# Patient Record
Sex: MALE | URBAN - METROPOLITAN AREA
[De-identification: names, ages, dates, MRNs, and addresses within clinical notes are randomized per-mention and may not be internally consistent; named-entity substitution may affect disease eponyms.]

---

## 2021-10-19 ENCOUNTER — APPOINTMENT (RX ONLY)
Dept: URBAN - METROPOLITAN AREA CLINIC 77 | Facility: CLINIC | Age: 26
Setting detail: DERMATOLOGY
End: 2021-10-19

## 2021-10-19 DIAGNOSIS — L21.8 OTHER SEBORRHEIC DERMATITIS: ICD-10-CM

## 2021-10-19 PROCEDURE — ? TREATMENT REGIMEN

## 2021-10-19 PROCEDURE — 99203 OFFICE O/P NEW LOW 30 MIN: CPT

## 2021-10-19 PROCEDURE — ? PRESCRIPTION

## 2021-10-19 PROCEDURE — ? COUNSELING

## 2021-10-19 RX ORDER — CLOBETASOL PROPIONATE 0.05 G/100ML
SHAMPOO TOPICAL
Qty: 118 | Refills: 3 | Status: ERX | COMMUNITY
Start: 2021-10-19

## 2021-10-19 RX ORDER — KETOCONAZOLE 20 MG/ML
SHAMPOO, SUSPENSION TOPICAL
Qty: 120 | Refills: 3 | Status: ERX | COMMUNITY
Start: 2021-10-19

## 2021-10-19 RX ORDER — HYDROCORTISONE ACETATE, IODOQUINOL 19; 10 MG/G; MG/G
CREAM TOPICAL
Qty: 30 | Refills: 3 | Status: ERX | COMMUNITY
Start: 2021-10-19

## 2021-10-19 RX ADMIN — HYDROCORTISONE ACETATE, IODOQUINOL: 19; 10 CREAM TOPICAL at 00:00

## 2021-10-19 RX ADMIN — KETOCONAZOLE: 20 SHAMPOO, SUSPENSION TOPICAL at 00:00

## 2021-10-19 RX ADMIN — CLOBETASOL PROPIONATE: 0.05 SHAMPOO TOPICAL at 00:00

## 2021-10-19 NOTE — PROCEDURE: TREATMENT REGIMEN
Detail Level: Zone
Plan: Location: Face and scalp \\nPrescribe: ketoconazole 2 % shampoo 2-3x/week (Apply to face and scalp, let it sit for a few mins, then rinse) \\nVytone 1.9 %-1 % topical cream packet (Apply to face nightly as needed for flare ups.)\\nclobetasol 0.05 % shampoo (Apply to wet scalp 1-2 times a week, leave on for 30 mins to 1 hour  and then wash off to the back. Avoid face.) \\n\\nDiscussed with patient that this irritation, dry scaly rash is an immune mediated condition that can be triggered or exacerbated by several factors such as lack of sleep, stress, allergies, or illness. \\nThere is also a genetic component which we discussed can be helped by taking good care of the skin with a barrier cream and avoiding harsh products.\\n\\nWill prescribe Ketoconazole tropical shampoo, Clobetsol topical shampoo and Vytone topical cream x 30 days to help relieve inflammation.\\nRecommend taking Allegra 180mg daily to decrease histamine level and eliminate this trigger.\\n\\n\\nPLAN: \\n\\n1. Patient is to lather Clobetasol topical shampoo into the scalp allow to sit for an hour and then rinse back \\n2.Patient is to apply Vytone topical cream nightly, rinse in the morning (face) \\n3.Patient is to lather Ketoconazole shampoo on the beard, eyebrows and hair line\\n\\n\\nPatient is to follow up in 6-8 weeks

## 2021-12-06 ENCOUNTER — APPOINTMENT (RX ONLY)
Dept: URBAN - METROPOLITAN AREA CLINIC 77 | Facility: CLINIC | Age: 26
Setting detail: DERMATOLOGY
End: 2021-12-06

## 2021-12-06 DIAGNOSIS — L21.8 OTHER SEBORRHEIC DERMATITIS: ICD-10-CM

## 2021-12-06 PROCEDURE — 99213 OFFICE O/P EST LOW 20 MIN: CPT

## 2021-12-06 PROCEDURE — ? TREATMENT REGIMEN

## 2021-12-06 PROCEDURE — ? COUNSELING

## 2021-12-06 PROCEDURE — ? PRESCRIPTION

## 2021-12-06 RX ORDER — DESONIDE 0.5 MG/G
CREAM TOPICAL BID
Qty: 60 | Refills: 6 | Status: ERX | COMMUNITY
Start: 2021-12-06

## 2021-12-06 RX ORDER — KETOCONAZOLE 20 MG/ML
SHAMPOO, SUSPENSION TOPICAL
Qty: 120 | Refills: 7 | Status: ERX

## 2021-12-06 RX ORDER — HYDROCORTISONE ACETATE, IODOQUINOL 19; 10 MG/G; MG/G
CREAM TOPICAL
Qty: 30 | Refills: 6 | Status: ERX

## 2021-12-06 RX ORDER — CLOBETASOL PROPIONATE 0.05 G/100ML
SHAMPOO TOPICAL
Qty: 118 | Refills: 6 | Status: ERX

## 2021-12-06 RX ADMIN — DESONIDE: 0.5 CREAM TOPICAL at 00:00

## 2021-12-06 NOTE — PROCEDURE: TREATMENT REGIMEN
Detail Level: Zone
Plan: Location: Face and scalp \\nPrescribe: ketoconazole 2 % shampoo 2-3x/week (Apply to face and scalp, let it sit for a few mins, then rinse) \\nVytone 1.9 %-1 % topical cream packet (Apply to face nightly as needed for flare ups.)\\nclobetasol 0.05 % shampoo (Apply to wet scalp 1-2 times a week, leave on for 30 mins to 1 hour  and then wash off to the back. Avoid face.) Desonide topical cream\\n\\nPatient comes in today for dandruff on scalp.\\nPatient states he has seen an improvement on scalp with medication regimens he was prescribed.\\nDiscussed with patient that his scalp looks so much better and has improved a lot from his last office visit.\\nDiscussed with patient I will prescribe him Desonide topical cream that he will apply to scalp.\\nReiterated patient to start taking 3 tablets of Allegra or Zyrtec daily to lower anti histamine levels.\\nPatient is here for further treatment and evaluation.\\n\\nDiscussed with patient that this irritation, dry scaly rash is an immune mediated condition that can be triggered or exacerbated by several factors such as lack of sleep, stress, allergies, or illness. \\nThere is also a genetic component which we discussed can be helped by taking good care of the skin with a barrier cream and avoiding harsh products.\\n\\n\\nPLAN: \\n\\n1. Patient is to lather Clobetasol topical shampoo into the scalp allow to sit for an hour and then rinse back \\n2.Patient is to apply Vytone topical cream nightly, rinse in the morning (face) \\n3.Patient is to lather Ketoconazole shampoo on the beard, eyebrows and hair line\\n4. Desonide topical gel-----I am giving this gel because it should be easy to rub in to the beard\\n\\n\\nFollow up: PRN

## 2022-02-01 ENCOUNTER — APPOINTMENT (RX ONLY)
Dept: URBAN - METROPOLITAN AREA CLINIC 77 | Facility: CLINIC | Age: 27
Setting detail: DERMATOLOGY
End: 2022-02-01

## 2022-02-01 DIAGNOSIS — L21.8 OTHER SEBORRHEIC DERMATITIS: ICD-10-CM

## 2022-02-01 DIAGNOSIS — D22 MELANOCYTIC NEVI: ICD-10-CM

## 2022-02-01 DIAGNOSIS — L81.4 OTHER MELANIN HYPERPIGMENTATION: ICD-10-CM

## 2022-02-01 PROBLEM — D22.5 MELANOCYTIC NEVI OF TRUNK: Status: ACTIVE | Noted: 2022-02-01

## 2022-02-01 PROBLEM — D48.5 NEOPLASM OF UNCERTAIN BEHAVIOR OF SKIN: Status: ACTIVE | Noted: 2022-02-01

## 2022-02-01 PROBLEM — D22.4 MELANOCYTIC NEVI OF SCALP AND NECK: Status: ACTIVE | Noted: 2022-02-01

## 2022-02-01 PROBLEM — D22.61 MELANOCYTIC NEVI OF RIGHT UPPER LIMB, INCLUDING SHOULDER: Status: ACTIVE | Noted: 2022-02-01

## 2022-02-01 PROCEDURE — ? COUNSELING

## 2022-02-01 PROCEDURE — 99213 OFFICE O/P EST LOW 20 MIN: CPT | Mod: 25

## 2022-02-01 PROCEDURE — ? PRESCRIPTION

## 2022-02-01 PROCEDURE — ? BIOPSY BY SHAVE METHOD

## 2022-02-01 PROCEDURE — 11102 TANGNTL BX SKIN SINGLE LES: CPT

## 2022-02-01 PROCEDURE — ? TREATMENT REGIMEN

## 2022-02-01 RX ORDER — CLOBETASOL PROPIONATE 0.05 G/100ML
SHAMPOO TOPICAL
Qty: 118 | Refills: 8 | Status: ERX

## 2022-02-01 RX ORDER — CLOBETASOL PROPIONATE 0.5 MG/ML
SOLUTION TOPICAL
Qty: 50 | Refills: 12 | Status: ERX | COMMUNITY
Start: 2022-02-01

## 2022-02-01 RX ORDER — KETOCONAZOLE 20 MG/ML
SHAMPOO, SUSPENSION TOPICAL
Qty: 120 | Refills: 11 | Status: ERX

## 2022-02-01 RX ORDER — HYDROCORTISONE ACETATE, IODOQUINOL 19; 10 MG/G; MG/G
CREAM TOPICAL
Qty: 30 | Refills: 10 | Status: ERX

## 2022-02-01 RX ORDER — DESONIDE 0.5 MG/G
CREAM TOPICAL BID
Qty: 60 | Refills: 9 | Status: ERX

## 2022-02-01 RX ADMIN — CLOBETASOL PROPIONATE: 0.5 SOLUTION TOPICAL at 00:00

## 2022-02-01 ASSESSMENT — LOCATION DETAILED DESCRIPTION DERM
LOCATION DETAILED: SUPERIOR THORACIC SPINE
LOCATION DETAILED: MID TRAPEZIAL NECK
LOCATION DETAILED: LEFT ANTERIOR SHOULDER
LOCATION DETAILED: RIGHT POSTERIOR SHOULDER
LOCATION DETAILED: RIGHT ANTERIOR SHOULDER

## 2022-02-01 ASSESSMENT — LOCATION SIMPLE DESCRIPTION DERM
LOCATION SIMPLE: UPPER BACK
LOCATION SIMPLE: LEFT SHOULDER
LOCATION SIMPLE: TRAPEZIAL NECK
LOCATION SIMPLE: RIGHT SHOULDER

## 2022-02-01 ASSESSMENT — LOCATION ZONE DERM
LOCATION ZONE: NECK
LOCATION ZONE: ARM
LOCATION ZONE: TRUNK

## 2022-02-01 NOTE — PROCEDURE: TREATMENT REGIMEN
Detail Level: Zone
Plan: Location: Face and scalp \\nContinue using: ketoconazole 2 % shampoo 2-3x/week (Apply to face and scalp, let it sit for a few mins, then rinse) \\n                  Vytone 1.9 %-1 % topical cream packet (Apply to face nightly as needed for flare ups.)\\n                  clobetasol 0.05 % shampoo (Apply to wet scalp 1-2 times a week, leave on for 30 mins to 1 hour  and then wash off to the back. Avoid face.) \\n                  Desonide topical cream\\nNew prescription: clobetasol 0.05% scalp solution (ears)\\n\\nPatient is here following up\\nPatient states he has seen an improvement on scalp with all the medication and the treatment regimen helped significantly \\nOverall he is very satisfied with the the results and has no complaints \\nDiscussed with patient that his scalp looks so much better and has improved a lot from his last office visit.\\nDiscussed with patient to be diligent with his treatment regimen \\nDiscussed with patient I will refill all his medications \\nReiterated patient to continue taking 3 tablets of Allegra or Zyrtec daily to lower anti histamine levels.\\nReDiscussed with patient that this irritation, dry scaly rash is an immune mediated condition that can be triggered or exacerbated by several factors such as lack of sleep, stress, allergies, or illness. \\nThere is also a genetic component which we discussed can be helped by taking good care of the skin with a barrier cream and avoiding harsh products.\\nInformed the patient he does have a little scaly in his ear in which I will prescribe clobetasol 0.05% scalp solution for him to use in his ears \\n\\n\\nPLAN: \\n\\n1. Will refill and instructed the Patient is to lather Clobetasol topical shampoo into the scalp allow to sit for an hour and then rinse back \\n2.will refill and instructed the Patient is to apply Vytone topical cream nightly, rinse in the morning (face) \\n3.will refill and instructed the Patient is to lather Ketoconazole shampoo on the beard, eyebrows and hair line\\n4. Will refill and instructed the patient to use Desonide topical gel-----I am giving this gel because it should be easy to rub in to the lopez\\n5. Will prescribe clobetasol 0.05% scalp solution for the patient to use in his ears  \\n\\nFollow up: PRN